# Patient Record
Sex: FEMALE | Race: WHITE | URBAN - METROPOLITAN AREA
[De-identification: names, ages, dates, MRNs, and addresses within clinical notes are randomized per-mention and may not be internally consistent; named-entity substitution may affect disease eponyms.]

---

## 2021-11-19 ENCOUNTER — HOSPITAL ENCOUNTER (INPATIENT)
Facility: HOSPITAL | Age: 26
LOS: 2 days | Discharge: HOME | End: 2021-11-21
Attending: OBSTETRICS & GYNECOLOGY | Admitting: OBSTETRICS & GYNECOLOGY
Payer: MEDICAID

## 2021-11-19 ENCOUNTER — ANESTHESIA (OUTPATIENT)
Dept: OBGYN UNIT | Facility: HOSPITAL | Age: 26
End: 2021-11-19
Payer: MEDICAID

## 2021-11-19 ENCOUNTER — ANESTHESIA EVENT (OUTPATIENT)
Dept: OBGYN UNIT | Facility: HOSPITAL | Age: 26
End: 2021-11-19
Payer: MEDICAID

## 2021-11-19 PROBLEM — Z34.90 PREGNANCY: Status: ACTIVE | Noted: 2021-11-19

## 2021-11-19 PROCEDURE — 86901 BLOOD TYPING SEROLOGIC RH(D): CPT | Performed by: OBSTETRICS & GYNECOLOGY

## 2021-11-19 PROCEDURE — 85025 COMPLETE CBC W/AUTO DIFF WBC: CPT | Performed by: OBSTETRICS & GYNECOLOGY

## 2021-11-19 PROCEDURE — 0KQM0ZZ REPAIR PERINEUM MUSCLE, OPEN APPROACH: ICD-10-PCS | Performed by: OBSTETRICS & GYNECOLOGY

## 2021-11-19 PROCEDURE — 86850 RBC ANTIBODY SCREEN: CPT | Performed by: OBSTETRICS & GYNECOLOGY

## 2021-11-19 PROCEDURE — 86900 BLOOD TYPING SEROLOGIC ABO: CPT | Performed by: OBSTETRICS & GYNECOLOGY

## 2021-11-19 PROCEDURE — 3E033VJ INTRODUCTION OF OTHER HORMONE INTO PERIPHERAL VEIN, PERCUTANEOUS APPROACH: ICD-10-PCS | Performed by: OBSTETRICS & GYNECOLOGY

## 2021-11-19 RX ORDER — ACETAMINOPHEN 500 MG
500 TABLET ORAL EVERY 6 HOURS PRN
Status: DISCONTINUED | OUTPATIENT
Start: 2021-11-19 | End: 2021-11-20 | Stop reason: HOSPADM

## 2021-11-19 RX ORDER — DIAPER,BRIEF,INFANT-TODD,DISP
1 EACH MISCELLANEOUS EVERY 6 HOURS PRN
Status: DISCONTINUED | OUTPATIENT
Start: 2021-11-19 | End: 2021-11-21

## 2021-11-19 RX ORDER — SODIUM CHLORIDE, SODIUM LACTATE, POTASSIUM CHLORIDE, CALCIUM CHLORIDE 600; 310; 30; 20 MG/100ML; MG/100ML; MG/100ML; MG/100ML
INJECTION, SOLUTION INTRAVENOUS CONTINUOUS
Status: DISCONTINUED | OUTPATIENT
Start: 2021-11-19 | End: 2021-11-20 | Stop reason: HOSPADM

## 2021-11-19 RX ORDER — BUPIVACAINE HYDROCHLORIDE 2.5 MG/ML
INJECTION, SOLUTION EPIDURAL; INFILTRATION; INTRACAUDAL
Status: COMPLETED | OUTPATIENT
Start: 2021-11-19 | End: 2021-11-19

## 2021-11-19 RX ORDER — SIMETHICONE 80 MG
80 TABLET,CHEWABLE ORAL 3 TIMES DAILY PRN
Status: DISCONTINUED | OUTPATIENT
Start: 2021-11-19 | End: 2021-11-21

## 2021-11-19 RX ORDER — LIDOCAINE HYDROCHLORIDE 10 MG/ML
INJECTION, SOLUTION INFILTRATION; PERINEURAL
Status: COMPLETED | OUTPATIENT
Start: 2021-11-19 | End: 2021-11-19

## 2021-11-19 RX ORDER — ONDANSETRON 2 MG/ML
4 INJECTION INTRAMUSCULAR; INTRAVENOUS EVERY 6 HOURS PRN
Status: DISCONTINUED | OUTPATIENT
Start: 2021-11-19 | End: 2021-11-20 | Stop reason: HOSPADM

## 2021-11-19 RX ORDER — ONDANSETRON 2 MG/ML
4 INJECTION INTRAMUSCULAR; INTRAVENOUS EVERY 6 HOURS PRN
Status: DISCONTINUED | OUTPATIENT
Start: 2021-11-19 | End: 2021-11-21

## 2021-11-19 RX ORDER — BUPIVACAINE HYDROCHLORIDE 2.5 MG/ML
20 INJECTION, SOLUTION EPIDURAL; INFILTRATION; INTRACAUDAL ONCE
Status: DISCONTINUED | OUTPATIENT
Start: 2021-11-19 | End: 2021-11-20 | Stop reason: HOSPADM

## 2021-11-19 RX ORDER — HYDROCODONE BITARTRATE AND ACETAMINOPHEN 5; 325 MG/1; MG/1
2 TABLET ORAL EVERY 6 HOURS PRN
Status: CANCELLED | OUTPATIENT
Start: 2021-11-19

## 2021-11-19 RX ORDER — GLYCERIN/MINERAL OIL
LOTION (ML) TOPICAL
COMMUNITY

## 2021-11-19 RX ORDER — BUPIVACAINE HCL/0.9 % NACL/PF 0.25 %
5 PLASTIC BAG, INJECTION (ML) EPIDURAL AS NEEDED
Status: DISCONTINUED | OUTPATIENT
Start: 2021-11-19 | End: 2021-11-21

## 2021-11-19 RX ORDER — IBUPROFEN 600 MG/1
600 TABLET ORAL EVERY 6 HOURS
Status: DISCONTINUED | OUTPATIENT
Start: 2021-11-19 | End: 2021-11-21

## 2021-11-19 RX ORDER — DOCUSATE SODIUM 100 MG/1
100 CAPSULE, LIQUID FILLED ORAL 2 TIMES DAILY
Status: DISCONTINUED | OUTPATIENT
Start: 2021-11-19 | End: 2021-11-21

## 2021-11-19 RX ORDER — BISACODYL 10 MG
10 SUPPOSITORY, RECTAL RECTAL ONCE AS NEEDED
Status: DISCONTINUED | OUTPATIENT
Start: 2021-11-19 | End: 2021-11-21

## 2021-11-19 RX ORDER — DEXTROSE, SODIUM CHLORIDE, SODIUM LACTATE, POTASSIUM CHLORIDE, AND CALCIUM CHLORIDE 5; .6; .31; .03; .02 G/100ML; G/100ML; G/100ML; G/100ML; G/100ML
INJECTION, SOLUTION INTRAVENOUS CONTINUOUS
Status: DISCONTINUED | OUTPATIENT
Start: 2021-11-19 | End: 2021-11-20 | Stop reason: HOSPADM

## 2021-11-19 RX ORDER — TRISODIUM CITRATE DIHYDRATE AND CITRIC ACID MONOHYDRATE 500; 334 MG/5ML; MG/5ML
30 SOLUTION ORAL AS NEEDED
Status: DISCONTINUED | OUTPATIENT
Start: 2021-11-19 | End: 2021-11-20 | Stop reason: HOSPADM

## 2021-11-19 RX ORDER — IBUPROFEN 600 MG/1
600 TABLET ORAL EVERY 6 HOURS PRN
Status: DISCONTINUED | OUTPATIENT
Start: 2021-11-19 | End: 2021-11-20 | Stop reason: HOSPADM

## 2021-11-19 RX ORDER — LIDOCAINE HYDROCHLORIDE AND EPINEPHRINE 15; 5 MG/ML; UG/ML
INJECTION, SOLUTION EPIDURAL
Status: COMPLETED | OUTPATIENT
Start: 2021-11-19 | End: 2021-11-19

## 2021-11-19 RX ORDER — AMMONIA INHALANTS 0.04 G/.3ML
0.3 INHALANT RESPIRATORY (INHALATION) AS NEEDED
Status: DISCONTINUED | OUTPATIENT
Start: 2021-11-19 | End: 2021-11-20 | Stop reason: HOSPADM

## 2021-11-19 RX ORDER — ACETAMINOPHEN 325 MG/1
650 TABLET ORAL EVERY 6 HOURS PRN
Status: DISCONTINUED | OUTPATIENT
Start: 2021-11-19 | End: 2021-11-21

## 2021-11-19 RX ORDER — NALBUPHINE HCL 10 MG/ML
2.5 AMPUL (ML) INJECTION
Status: DISCONTINUED | OUTPATIENT
Start: 2021-11-19 | End: 2021-11-21

## 2021-11-19 RX ORDER — AMMONIA INHALANTS 0.04 G/.3ML
0.3 INHALANT RESPIRATORY (INHALATION) AS NEEDED
Status: DISCONTINUED | OUTPATIENT
Start: 2021-11-19 | End: 2021-11-21

## 2021-11-19 RX ORDER — LIDOCAINE HYDROCHLORIDE 10 MG/ML
30 INJECTION, SOLUTION EPIDURAL; INFILTRATION; INTRACAUDAL; PERINEURAL ONCE
Status: DISCONTINUED | OUTPATIENT
Start: 2021-11-19 | End: 2021-11-20 | Stop reason: HOSPADM

## 2021-11-19 RX ORDER — TERBUTALINE SULFATE 1 MG/ML
0.25 INJECTION, SOLUTION SUBCUTANEOUS AS NEEDED
Status: DISCONTINUED | OUTPATIENT
Start: 2021-11-19 | End: 2021-11-20 | Stop reason: HOSPADM

## 2021-11-19 RX ADMIN — LIDOCAINE HYDROCHLORIDE AND EPINEPHRINE 5 ML: 15; 5 INJECTION, SOLUTION EPIDURAL at 20:55:00

## 2021-11-19 RX ADMIN — BUPIVACAINE HYDROCHLORIDE 5 ML: 2.5 INJECTION, SOLUTION EPIDURAL; INFILTRATION; INTRACAUDAL at 20:55:00

## 2021-11-19 RX ADMIN — LIDOCAINE HYDROCHLORIDE 5 ML: 10 INJECTION, SOLUTION INFILTRATION; PERINEURAL at 20:55:00

## 2021-11-19 NOTE — PROGRESS NOTES
Pt is a 23year old female admitted to TR5/TR5-A. Patient presents with:  Leaking  Contractions     Pt is   45 6/7 pregnancy. History obtained, consents signed. Oriented to room, staff, and plan of care.

## 2021-11-20 PROCEDURE — 85025 COMPLETE CBC W/AUTO DIFF WBC: CPT | Performed by: OBSTETRICS & GYNECOLOGY

## 2021-11-20 PROCEDURE — 3E0334Z INTRODUCTION OF SERUM, TOXOID AND VACCINE INTO PERIPHERAL VEIN, PERCUTANEOUS APPROACH: ICD-10-PCS | Performed by: OBSTETRICS & GYNECOLOGY

## 2021-11-20 PROCEDURE — 85461 HEMOGLOBIN FETAL: CPT | Performed by: OBSTETRICS & GYNECOLOGY

## 2021-11-20 PROCEDURE — 88307 TISSUE EXAM BY PATHOLOGIST: CPT | Performed by: OBSTETRICS & GYNECOLOGY

## 2021-11-20 NOTE — H&P
[address] [facility] Patient Status:  Inpatient    1995 MRN [id number]   Location [address] [place] [address] Attending Venessa Cash MD   Hosp Day # 0 PCP No primary care provider on file. yes  Sterile speculum exam:   Sterile vaginal exam: Dilation: 2  Effacement: 100%  Station: -2  Position: Cephalic    Results:     Lab Results   Component Value Date    TREPONEMALAB negative 09/02/2021    TREPONEMALAB negative 09/02/2021    TREPONEMALAB ne

## 2021-11-20 NOTE — PROGRESS NOTES
[facility]    OB/GYNE Progress Note      Rusty Franklin Patient Status:  Inpatient    1995 MRN [id number]   Location [facility] 3SE Attending Jennye Denver, MD   Hosp Day # 1 PCP No primary care provider on file.         Ass

## 2021-11-20 NOTE — ANESTHESIA PREPROCEDURE EVALUATION
Anesthesia PreOp Note    HPI:     Vikki Smyth is a 23year old female who presents for preoperative consultation requested by: * No surgeons listed *    Date of Surgery: 11/19/2021    * No procedures listed *  Indication: * No pre-op diagnosis entered * 11/19/21 1600, 8 fer-units/min at 11/19/21 1600  lactated ringers IV bolus 1,000 mL, 1,000 mL, Intravenous, Once, Nonah Gottron, MD  fentaNYL 2mcg/ml & bupivacaine 1.25mg/ml epidural infusion, , Epidural, Continuous, MD Lois Goyal mass index is 44.63 kg/m². height is 1.575 m (5' 2\") and weight is 110.7 kg (244 lb). Her temperature is 98.2 °F (36.8 °C). Her blood pressure is 136/80 and her pulse is 113. Her oxygen saturation is 100%.     11/19/21 2046 11/19/21 2100 11/19/21 2125

## 2021-11-20 NOTE — ANESTHESIA PROCEDURE NOTES
Labor Analgesia    Date/Time: 11/19/2021 8:55 PM  Performed by: Kurt Vigil MD  Authorized by: Kurt Vigil MD       General Information and Staff    Start Time:  11/19/2021 8:41 PM  End Time:  11/19/2021 8:55 PM  Anesthesiologist:  Christina Mckinley

## 2021-11-20 NOTE — OPERATIVE REPORT
[facility]    Vaginal Delivery Note    Luis Koch Patient Status:  Inpatient    1995 MRN [id number]   Location [address] Attending Hugo Soto MD   Hosp Day # 0 PCP No primary care provider on fi

## 2021-11-20 NOTE — PROGRESS NOTES
Patient up to bathroom with assist x 2. Unable to void at this time. Patient transferred to mother/baby room 353 per wheelchair in stable condition with baby and personal belongings. Accompanied by significant other and staff.   Report given to Yamila Jiménez

## 2021-11-20 NOTE — PROGRESS NOTES
Received patient into room 353 via [facility]. Bedside shift report received from Larisa Saini. Pt transferred to bed. Bed in lowest and locked position. Side rails up x2. VSS. IV site unremarkable.  Baby present in open crib.  ID bands matched. [place] and family or

## 2021-11-20 NOTE — ANESTHESIA POSTPROCEDURE EVALUATION
Patient: Sabino Vasquez    Procedure Summary     Date: 11/19/21 Room / Location:     Anesthesia Start: 2041 Anesthesia Stop:     Procedure: LABOR ANALGESIA Diagnosis:     Scheduled Providers:  Anesthesiologist: Aziza Herr MD    Anesthesia Type: ep

## 2021-11-21 VITALS
HEIGHT: 62 IN | RESPIRATION RATE: 16 BRPM | HEART RATE: 78 BPM | WEIGHT: 244 LBS | TEMPERATURE: 98 F | OXYGEN SATURATION: 100 % | DIASTOLIC BLOOD PRESSURE: 73 MMHG | SYSTOLIC BLOOD PRESSURE: 122 MMHG | BODY MASS INDEX: 44.9 KG/M2

## 2021-11-21 RX ORDER — PSEUDOEPHEDRINE HCL 30 MG
100 TABLET ORAL 2 TIMES DAILY
Qty: 20 CAPSULE | Refills: 1 | Status: SHIPPED | OUTPATIENT
Start: 2021-11-21

## 2021-11-21 RX ORDER — MELATONIN
325
Qty: 42 TABLET | Refills: 1 | Status: SHIPPED | OUTPATIENT
Start: 2021-11-21

## 2021-11-21 RX ORDER — IBUPROFEN 600 MG/1
600 TABLET ORAL EVERY 6 HOURS
Qty: 20 TABLET | Refills: 1 | Status: SHIPPED | OUTPATIENT
Start: 2021-11-21

## 2021-11-21 NOTE — DISCHARGE SUMMARY
[facility]    Discharge Summary    Dayne Lopez Patient Status:  Inpatient    1995 MRN [id number]   Location [facility] 3SE Attending Jennifer Villarreal MD   Hosp Day # 2 PCP No primary care provider on file.      Date of Ad In 6 weeks.     Specialty: OBSTETRICS & GYNECOLOGY  Contact information:  [address] [place] [address] [place]  [address]  [phone]                         Follow up Labs: in              Heather Marcelo MD  11/21/2021

## 2021-11-21 NOTE — CM/SW NOTE
MDO to MARIA INES for finances. MARIA INES LVM w/ Ana Soto @ [facility] to assist w/Medicaid. MARIA INES tubed appropriate resources to pt for Medicaid/WIC. SW/CM to remain available for support and/or discharge planning.      NEAL Graf, [employer] Work   MEK:#[id number]

## 2021-11-21 NOTE — PLAN OF CARE
Problem: PAIN - ADULT  Goal: Verbalizes/displays adequate comfort level or patient's stated pain goal  Description: INTERVENTIONS:  - Encourage pt to monitor pain and request assistance  - Assess pain using appropriate pain scale  - Administer analgesics Progressing  Goal: Appropriate maternal -  bonding  Description: INTERVENTIONS:  - Assess caregiver- interactions. - Assess caregiver's emotional status and coping mechanisms. - Encourage caregiver to participate in  daily care.   - A

## 2022-07-28 LAB
HEPATITIS B SURFACE ANTIGEN OB: NEGATIVE
HIV RESULT OB: NEGATIVE
RAPID PLASMA REAGIN OB: NONREACTIVE

## 2022-11-15 LAB
AMB EXT TREPONEMAL ANTIBODIES: NEGATIVE
HIV RESULT OB: NEGATIVE

## 2022-11-19 LAB — ANTIBODY SCREEN OB: POSITIVE

## 2022-11-28 LAB — AMB EXT STREP B CULTURE: NEGATIVE

## 2022-12-12 ENCOUNTER — HOSPITAL ENCOUNTER (OUTPATIENT)
Facility: HOSPITAL | Age: 27
Discharge: HOME | End: 2022-12-12
Attending: OBSTETRICS & GYNECOLOGY | Admitting: OBSTETRICS & GYNECOLOGY
Payer: MEDICAID

## 2022-12-12 VITALS — DIASTOLIC BLOOD PRESSURE: 79 MMHG | SYSTOLIC BLOOD PRESSURE: 119 MMHG | HEART RATE: 119 BPM

## 2022-12-12 PROCEDURE — 99213 OFFICE O/P EST LOW 20 MIN: CPT

## 2022-12-12 PROCEDURE — 59025 FETAL NON-STRESS TEST: CPT

## 2022-12-12 NOTE — PROGRESS NOTES
Pt is a 33year old female admitted to TR1/TR1-A. Patient presents with:  Contractions: Irregular since 03:30 am     Pt is  38w0d intra-uterine pregnancy. History obtained, consents signed. Oriented to room, staff, and plan of care.

## 2022-12-12 NOTE — TRIAGE
[facility]      Triage Note    Jo Patel Patient Status:  Outpatient    1995 MRN [id number]   Location [address] Attending Danni Lynn MD   Hosp Day # 0 PCP No primary care provider on file. Deja Muir: [id number]  Estimated Date of Delivery: 22  Gestation: 38w0d    Chief Complaint    Contractions         Allergies:    Clindamycin             HIVES    Orders Placed This Encounter      Rubella, IGG      RPR W/Conf      Hepatitis B Surface Antigen      Rapid HIV      Rapid HIV      T Pallidum Screening Cascade      Strep B Culture      Lab Results   Component Value Date    WBC 15.1 (H) 2021    HGB 7.3 (L) 2021    HCT 24.3 (L) 2021    .0 2021       Clinitek UA  No results found for: Yue Sobia, GLUUR, URINEBILI, URINEKETONE, SPECGRAVITY, PHUR, PROTURINE, UROBILI, URINENITRITE, URINELEUK, URINECUL    UA  No results found for: COLORUR, CLARITY, SPECGRAVITY, PROUR, GLUUR, KETUR, BILUR, BLOODURINE, NITRITE, UROBILINOGEN, LEUUR, UASA     22  0745   BP: 119/79   Pulse: 119       NST  Variability: Moderate           Accelerations: Yes           Decelerations: None            Baseline: 125 BPM           Uterine Irritability: No           Contractions: Irregular                                        Acoustic Stimulator: No           Nonstress Test Interpretation: Reactive           Nonstress Test Second Interpretation: Reactive                        Additional Comments       Patient presents with:  Contractions: Irregular since 03:30 am  EFM tracing reactive with irregular contractions noted. SVE 0/0/-3. Dr Sincere Martinez informed of findings. Discharge order received. Kick count and labor precautions reviewed with pt. Pt states understanding. Discharged to home.       Pk Medellin RN  2022 8:35 AM

## 2022-12-15 ENCOUNTER — HOSPITAL ENCOUNTER (INPATIENT)
Facility: HOSPITAL | Age: 27
LOS: 2 days | Discharge: HOME | End: 2022-12-17
Attending: OBSTETRICS & GYNECOLOGY | Admitting: OBSTETRICS & GYNECOLOGY
Payer: MEDICAID

## 2022-12-15 ENCOUNTER — ANESTHESIA (OUTPATIENT)
Dept: OBGYN UNIT | Facility: HOSPITAL | Age: 27
End: 2022-12-15
Payer: MEDICAID

## 2022-12-15 ENCOUNTER — ANESTHESIA EVENT (OUTPATIENT)
Dept: OBGYN UNIT | Facility: HOSPITAL | Age: 27
End: 2022-12-15
Payer: MEDICAID

## 2022-12-15 LAB
ANTIBODY SCREEN: NEGATIVE
BASOPHILS # BLD AUTO: 0.02 X10(3) UL (ref 0–0.2)
BASOPHILS NFR BLD AUTO: 0.2 %
DEPRECATED RDW RBC AUTO: 45.6 FL (ref 35.1–46.3)
EOSINOPHIL # BLD AUTO: 0.04 X10(3) UL (ref 0–0.7)
EOSINOPHIL NFR BLD AUTO: 0.5 %
ERYTHROCYTE [DISTWIDTH] IN BLOOD BY AUTOMATED COUNT: 13.7 % (ref 11–15)
HCT VFR BLD AUTO: 39.1 %
HGB BLD-MCNC: 13.3 G/DL
IMM GRANULOCYTES # BLD AUTO: 0.03 X10(3) UL (ref 0–1)
IMM GRANULOCYTES NFR BLD: 0.4 %
LYMPHOCYTES # BLD AUTO: 1.7 X10(3) UL (ref 1–4)
LYMPHOCYTES NFR BLD AUTO: 20 %
MCH RBC QN AUTO: 31.1 PG (ref 26–34)
MCHC RBC AUTO-ENTMCNC: 34 G/DL (ref 31–37)
MCV RBC AUTO: 91.4 FL
MONOCYTES # BLD AUTO: 0.35 X10(3) UL (ref 0.1–1)
MONOCYTES NFR BLD AUTO: 4.1 %
NEUTROPHILS # BLD AUTO: 6.37 X10 (3) UL (ref 1.5–7.7)
NEUTROPHILS # BLD AUTO: 6.37 X10(3) UL (ref 1.5–7.7)
NEUTROPHILS NFR BLD AUTO: 74.8 %
PLATELET # BLD AUTO: 280 10(3)UL (ref 150–450)
RBC # BLD AUTO: 4.28 X10(6)UL
RH BLOOD TYPE: NEGATIVE
RUPTURE OF MEMBRANE (ROM): POSITIVE
SARS-COV-2 RNA RESP QL NAA+PROBE: NOT DETECTED
WBC # BLD AUTO: 8.5 X10(3) UL (ref 4–11)

## 2022-12-15 PROCEDURE — 0HQ9XZZ REPAIR PERINEUM SKIN, EXTERNAL APPROACH: ICD-10-PCS | Performed by: OBSTETRICS & GYNECOLOGY

## 2022-12-15 PROCEDURE — 84112 EVAL AMNIOTIC FLUID PROTEIN: CPT | Performed by: OBSTETRICS & GYNECOLOGY

## 2022-12-15 PROCEDURE — 99214 OFFICE O/P EST MOD 30 MIN: CPT

## 2022-12-15 PROCEDURE — 86901 BLOOD TYPING SEROLOGIC RH(D): CPT | Performed by: OBSTETRICS & GYNECOLOGY

## 2022-12-15 PROCEDURE — 85025 COMPLETE CBC W/AUTO DIFF WBC: CPT | Performed by: OBSTETRICS & GYNECOLOGY

## 2022-12-15 PROCEDURE — 86850 RBC ANTIBODY SCREEN: CPT | Performed by: OBSTETRICS & GYNECOLOGY

## 2022-12-15 PROCEDURE — 86900 BLOOD TYPING SEROLOGIC ABO: CPT | Performed by: OBSTETRICS & GYNECOLOGY

## 2022-12-15 RX ORDER — LIDOCAINE HYDROCHLORIDE 10 MG/ML
30 INJECTION, SOLUTION EPIDURAL; INFILTRATION; INTRACAUDAL; PERINEURAL ONCE
Status: DISCONTINUED | OUTPATIENT
Start: 2022-12-15 | End: 2022-12-16 | Stop reason: HOSPADM

## 2022-12-15 RX ORDER — AMMONIA INHALANTS 0.04 G/.3ML
0.3 INHALANT RESPIRATORY (INHALATION) AS NEEDED
Status: DISCONTINUED | OUTPATIENT
Start: 2022-12-15 | End: 2022-12-17

## 2022-12-15 RX ORDER — ONDANSETRON 2 MG/ML
4 INJECTION INTRAMUSCULAR; INTRAVENOUS EVERY 6 HOURS PRN
Status: DISCONTINUED | OUTPATIENT
Start: 2022-12-15 | End: 2022-12-16 | Stop reason: HOSPADM

## 2022-12-15 RX ORDER — ONDANSETRON 2 MG/ML
4 INJECTION INTRAMUSCULAR; INTRAVENOUS EVERY 6 HOURS PRN
Status: DISCONTINUED | OUTPATIENT
Start: 2022-12-15 | End: 2022-12-17

## 2022-12-15 RX ORDER — ACETAMINOPHEN 500 MG
500 TABLET ORAL EVERY 6 HOURS PRN
Status: DISCONTINUED | OUTPATIENT
Start: 2022-12-15 | End: 2022-12-17

## 2022-12-15 RX ORDER — ACETAMINOPHEN 500 MG
1000 TABLET ORAL EVERY 6 HOURS PRN
Status: DISCONTINUED | OUTPATIENT
Start: 2022-12-15 | End: 2022-12-17

## 2022-12-15 RX ORDER — AMMONIA INHALANTS 0.04 G/.3ML
0.3 INHALANT RESPIRATORY (INHALATION) AS NEEDED
Status: DISCONTINUED | OUTPATIENT
Start: 2022-12-15 | End: 2022-12-16 | Stop reason: HOSPADM

## 2022-12-15 RX ORDER — SODIUM CHLORIDE, SODIUM LACTATE, POTASSIUM CHLORIDE, CALCIUM CHLORIDE 600; 310; 30; 20 MG/100ML; MG/100ML; MG/100ML; MG/100ML
INJECTION, SOLUTION INTRAVENOUS CONTINUOUS
Status: DISCONTINUED | OUTPATIENT
Start: 2022-12-15 | End: 2022-12-16 | Stop reason: HOSPADM

## 2022-12-15 RX ORDER — DOCUSATE SODIUM 100 MG/1
100 CAPSULE, LIQUID FILLED ORAL 2 TIMES DAILY
Status: DISCONTINUED | OUTPATIENT
Start: 2022-12-15 | End: 2022-12-17

## 2022-12-15 RX ORDER — TRISODIUM CITRATE DIHYDRATE AND CITRIC ACID MONOHYDRATE 500; 334 MG/5ML; MG/5ML
30 SOLUTION ORAL AS NEEDED
Status: DISCONTINUED | OUTPATIENT
Start: 2022-12-15 | End: 2022-12-16 | Stop reason: HOSPADM

## 2022-12-15 RX ORDER — ACETAMINOPHEN 500 MG
500 TABLET ORAL EVERY 6 HOURS PRN
Status: DISCONTINUED | OUTPATIENT
Start: 2022-12-15 | End: 2022-12-16 | Stop reason: HOSPADM

## 2022-12-15 RX ORDER — DIAPER,BRIEF,INFANT-TODD,DISP
1 EACH MISCELLANEOUS EVERY 6 HOURS PRN
Status: DISCONTINUED | OUTPATIENT
Start: 2022-12-15 | End: 2022-12-17

## 2022-12-15 RX ORDER — BISACODYL 10 MG
10 SUPPOSITORY, RECTAL RECTAL ONCE AS NEEDED
Status: DISCONTINUED | OUTPATIENT
Start: 2022-12-15 | End: 2022-12-17

## 2022-12-15 RX ORDER — HYDROCODONE BITARTRATE AND ACETAMINOPHEN 5; 325 MG/1; MG/1
1 TABLET ORAL EVERY 6 HOURS PRN
Status: CANCELLED | OUTPATIENT
Start: 2022-12-15

## 2022-12-15 RX ORDER — SIMETHICONE 80 MG
80 TABLET,CHEWABLE ORAL 3 TIMES DAILY PRN
Status: DISCONTINUED | OUTPATIENT
Start: 2022-12-15 | End: 2022-12-17

## 2022-12-15 RX ORDER — LIDOCAINE HYDROCHLORIDE AND EPINEPHRINE 15; 5 MG/ML; UG/ML
INJECTION, SOLUTION EPIDURAL AS NEEDED
Status: DISCONTINUED | OUTPATIENT
Start: 2022-12-15 | End: 2022-12-15 | Stop reason: SURG

## 2022-12-15 RX ORDER — NALBUPHINE HCL 10 MG/ML
2.5 AMPUL (ML) INJECTION
Status: DISCONTINUED | OUTPATIENT
Start: 2022-12-15 | End: 2022-12-17

## 2022-12-15 RX ORDER — BUPIVACAINE HCL/0.9 % NACL/PF 0.25 %
5 PLASTIC BAG, INJECTION (ML) EPIDURAL AS NEEDED
Status: DISCONTINUED | OUTPATIENT
Start: 2022-12-15 | End: 2022-12-17

## 2022-12-15 RX ORDER — IBUPROFEN 600 MG/1
600 TABLET ORAL EVERY 6 HOURS
Status: DISCONTINUED | OUTPATIENT
Start: 2022-12-15 | End: 2022-12-17

## 2022-12-15 RX ORDER — BUPIVACAINE HYDROCHLORIDE 2.5 MG/ML
20 INJECTION, SOLUTION EPIDURAL; INFILTRATION; INTRACAUDAL ONCE
Status: COMPLETED | OUTPATIENT
Start: 2022-12-15 | End: 2022-12-15

## 2022-12-15 RX ORDER — IBUPROFEN 600 MG/1
600 TABLET ORAL EVERY 6 HOURS PRN
Status: DISCONTINUED | OUTPATIENT
Start: 2022-12-15 | End: 2022-12-16 | Stop reason: HOSPADM

## 2022-12-15 RX ORDER — TERBUTALINE SULFATE 1 MG/ML
0.25 INJECTION, SOLUTION SUBCUTANEOUS AS NEEDED
Status: DISCONTINUED | OUTPATIENT
Start: 2022-12-15 | End: 2022-12-16 | Stop reason: HOSPADM

## 2022-12-15 RX ORDER — DEXTROSE, SODIUM CHLORIDE, SODIUM LACTATE, POTASSIUM CHLORIDE, AND CALCIUM CHLORIDE 5; .6; .31; .03; .02 G/100ML; G/100ML; G/100ML; G/100ML; G/100ML
INJECTION, SOLUTION INTRAVENOUS CONTINUOUS
Status: DISCONTINUED | OUTPATIENT
Start: 2022-12-15 | End: 2022-12-16 | Stop reason: HOSPADM

## 2022-12-15 RX ORDER — LIDOCAINE HYDROCHLORIDE 10 MG/ML
INJECTION, SOLUTION EPIDURAL; INFILTRATION; INTRACAUDAL; PERINEURAL AS NEEDED
Status: DISCONTINUED | OUTPATIENT
Start: 2022-12-15 | End: 2022-12-15 | Stop reason: SURG

## 2022-12-15 RX ADMIN — BUPIVACAINE HYDROCHLORIDE 10 ML: 2.5 INJECTION, SOLUTION EPIDURAL; INFILTRATION; INTRACAUDAL at 20:06:00

## 2022-12-15 RX ADMIN — LIDOCAINE HYDROCHLORIDE 3 ML: 10 INJECTION, SOLUTION EPIDURAL; INFILTRATION; INTRACAUDAL; PERINEURAL at 20:02:00

## 2022-12-15 RX ADMIN — LIDOCAINE HYDROCHLORIDE AND EPINEPHRINE 5 ML: 15; 5 INJECTION, SOLUTION EPIDURAL at 20:05:00

## 2022-12-16 LAB
BASOPHILS # BLD AUTO: 0.02 X10(3) UL (ref 0–0.2)
BASOPHILS NFR BLD AUTO: 0.2 %
DEPRECATED RDW RBC AUTO: 45.1 FL (ref 35.1–46.3)
EOSINOPHIL # BLD AUTO: 0.04 X10(3) UL (ref 0–0.7)
EOSINOPHIL NFR BLD AUTO: 0.4 %
ERYTHROCYTE [DISTWIDTH] IN BLOOD BY AUTOMATED COUNT: 13.4 % (ref 11–15)
FETAL SCREEN RESULT: NEGATIVE
HCT VFR BLD AUTO: 33.6 %
HGB BLD-MCNC: 11.4 G/DL
IMM GRANULOCYTES # BLD AUTO: 0.03 X10(3) UL (ref 0–1)
IMM GRANULOCYTES NFR BLD: 0.3 %
LYMPHOCYTES # BLD AUTO: 2.22 X10(3) UL (ref 1–4)
LYMPHOCYTES NFR BLD AUTO: 20.4 %
MCH RBC QN AUTO: 30.8 PG (ref 26–34)
MCHC RBC AUTO-ENTMCNC: 33.9 G/DL (ref 31–37)
MCV RBC AUTO: 90.8 FL
MONOCYTES # BLD AUTO: 0.61 X10(3) UL (ref 0.1–1)
MONOCYTES NFR BLD AUTO: 5.6 %
NEUTROPHILS # BLD AUTO: 7.96 X10 (3) UL (ref 1.5–7.7)
NEUTROPHILS # BLD AUTO: 7.96 X10(3) UL (ref 1.5–7.7)
NEUTROPHILS NFR BLD AUTO: 73.1 %
PLATELET # BLD AUTO: 262 10(3)UL (ref 150–450)
RBC # BLD AUTO: 3.7 X10(6)UL
WBC # BLD AUTO: 10.9 X10(3) UL (ref 4–11)

## 2022-12-16 PROCEDURE — 3E0334Z INTRODUCTION OF SERUM, TOXOID AND VACCINE INTO PERIPHERAL VEIN, PERCUTANEOUS APPROACH: ICD-10-PCS | Performed by: OBSTETRICS & GYNECOLOGY

## 2022-12-16 PROCEDURE — 85025 COMPLETE CBC W/AUTO DIFF WBC: CPT | Performed by: OBSTETRICS & GYNECOLOGY

## 2022-12-16 PROCEDURE — 85461 HEMOGLOBIN FETAL: CPT | Performed by: OBSTETRICS & GYNECOLOGY

## 2022-12-16 RX ORDER — MELATONIN
325
Qty: 42 TABLET | Refills: 0 | Status: SHIPPED | OUTPATIENT
Start: 2022-12-16

## 2022-12-16 RX ORDER — DOCUSATE SODIUM 100 MG/1
100 CAPSULE, LIQUID FILLED ORAL 2 TIMES DAILY
Qty: 30 CAPSULE | Refills: 0 | Status: SHIPPED | OUTPATIENT
Start: 2022-12-16

## 2022-12-16 RX ORDER — IBUPROFEN 600 MG/1
600 TABLET ORAL EVERY 8 HOURS PRN
Qty: 20 TABLET | Refills: 0 | Status: SHIPPED | OUTPATIENT
Start: 2022-12-16

## 2022-12-16 NOTE — OPERATIVE REPORT
[facility]    Vaginal Delivery Note    Rita Common Patient Status:  Inpatient    1995 MRN [id number]   Location [address] Attending Sandy Barrios MD   Hosp Day # 0 PCP No primary care provider on file.      Delivery     Infant  Date of Delivery: 12/15/2022   Time of Delivery: 10:09 PM  Delivery Type: Normal spontaneous vaginal delivery    Infant Sex/Birthweight: female 7 lb 9 oz (3.43 kg)     Presentation Vertex [1]  Position Left [1] Occiput [1] Anterior [1]    Apgars:  1 minute:                 5 minutes:                          10 minutes:      Placenta  Date/Time of Delivery:     Delivery: spontaneous  Placenta to Pathology: no  Cord Gases Submitted: no  Cord Blood Collection: yes  Cord Tissue Collection: no  Cord Complications: none  Sponge and Needle Counts:  Verified yes    Maternal Anesthesia: epidural   Episiotomy/Laceration Repair  Laceration: perineal 1 degree  Rep w/ 30 vicryl  Delivery Complications  none    Neonatologist Present: no  Delivery Comment: juan    Intake/Output   EBL:  See chart ml    Mauro Yarbrough MD   12/15/2022  10:25 PM

## 2022-12-16 NOTE — CM/SW NOTE
SW self referral due to finances/WIC resources    SW met with patient and FOB  bedside. SW confirmed face sheet contact as correct. Baby boy/girl name:Baby girl [place]  Date & time of delivery:12/15/22 @ 10:09pm  Delivery method:Normal spontaneous vaginal delivery  Siblings age:1 yr old    Patient employed:Denied  Length of maternity leave:n/a    Father of baby employed:Yes  Length of paternity leave:Denied    Breast or formula feed:Breast and formula feed    Pediatrician: Dr. Juancarlos Hendricks contacted:Yes    Mental Health History: Denied    Medications:n/a    Therapist:n/a    Psychiatrist:n/a    SW discussed signs, symptoms and risks associated with post partum depression & anxiety. SW provided pt with PMAD resources. Other resources provided: Pt endorses being current w/WIC services. Patient support system:FOB and pt's parents    Patient denied current questions/needs from SW.    SW/CM to remain available for support and/or discharge planning.       Lauro Garcia MSW, [facility]  Social Work   LAB:#[id number]

## 2022-12-16 NOTE — PROGRESS NOTES
Patient received into room 358 via wheel chair. Beside report received from L&D RN Tucker Henley. Patient transferred to bed without incident. Bed in locked and low position. Side rails up x 2. VSS. Fundus firm at u/u, lochia small, no clots noted. IV site unremarkable. Pain level zero. Baby girl present at bedside in open crib, ID bands checked and verified. Patient/family oriented to unit, room and call light. Call light within patient reach. Reinforced to patient to call for assistance before getting out of bed to bathroom. Plan of care reviewed. Will continue to monitor per protocol.

## 2022-12-16 NOTE — ANESTHESIA POSTPROCEDURE EVALUATION
Patient: Asher Goins    Procedure Summary     Date: 12/15/22 Room / Location:     Anesthesia Start: 2002 Anesthesia Stop: 2214    Procedure: LABOR ANALGESIA Diagnosis:     Scheduled Providers:  Anesthesiologist: Lorna Jimenez MD    Anesthesia Type: epidural ASA Status: 2 - Emergent          Anesthesia Type: epidural    Vitals Value Taken Time   /70 12/15/22 2245   Temp  12/15/22 2247   Pulse 97 12/15/22 2245   Resp 16 12/15/22 2247   SpO2 100 % 12/15/22 2244   Vitals shown include unvalidated device data.     [address] [facility] AN Post Evaluation:   Patient Evaluated in PACU  Patient Participation: complete - patient participated  Level of Consciousness: awake  Pain Score: 0  Pain Management: adequate  Airway Patency:patent  Dental exam unchanged from preop  Yes    Cardiovascular Status: acceptable  Respiratory Status: acceptable  Postoperative Hydration acceptable      Renetta Smith MD  12/15/2022 10:47 PM

## 2022-12-16 NOTE — ANESTHESIA PROCEDURE NOTES
Labor Analgesia    Date/Time: 12/15/2022 8:02 PM  Performed by: Yuriy Garg MD  Authorized by: Yuriy Garg MD       General Information and Staff    Start Time:  12/15/2022 8:02 PM  End Time:  12/15/2022 8:07 PM  Anesthesiologist:  Yuriy Garg MD  Performed by:   Anesthesiologist  Patient Location:  OB  Site Identification: surface landmarks    Reason for Block: labor epidural    Preanesthetic Checklist: patient identified, IV checked, site marked, risks and benefits discussed, monitors and equipment checked, pre-op evaluation, timeout performed, IV bolus, anesthesia consent and sterile technique used      Procedure Details    Patient Position:  Sitting  Prep: ChloraPrep and patient draped    Monitoring:  Heart rate, cardiac monitor and continuous pulse ox  Approach:  Midline    Epidural Needle    Injection Technique:  BRET air  Needle Type:  Tuohy  Needle Gauge:  18 G  Needle Length:  3.5 in  Needle Insertion Depth:  6.5  Location:  L3-4    Spinal Needle      Catheter    Catheter Type:  Multi-orifice  Catheter Size:  20 G  Catheter at Skin Depth:  11  Test Dose:  Negative    Assessment    Sensory Level:  T10    Additional Comments

## 2022-12-16 NOTE — LACTATION NOTE
LACTATION NOTE - MOTHER           Problems identified  Problems identified: Knowledge deficit;Milk supply not WNL  Milk supply not WNL: Reduced (potential)  Problems Identified Other: Infant w/ jaundice under phototherapy    Maternal history  Maternal history: Anemia;Obesity    Breastfeeding goal  Breastfeeding goal: To maintain breast milk feeding per patient goal    Maternal Assessment  Bilateral Breasts: Soft;Symmetrical  Bilateral Nipples: Slightly everted/short  Prior breastfeeding experience (comment below): Multip; Unsuccessful  Breastfeeding Assistance: Breastfeeding assistance provided with permission    Pain assessment  Treatment of Sore Nipples: Lanolin    Guidelines for use of:  Breast pump type: Ameda Platinum;Hand Pump  Current use of pump[de-identified] Initiated  Suggested use of pump: Pump 8-12X/24hr  Other (comment): Mom reports infant BF well, now under phototherapy and receiving formula supplement. Educated on jaundice physiology, pumping indications and guidelines for supplementing. Breast pump initiated, encouraged to pump 8x/day and to continue offering breast prior to bottle feeding.

## 2022-12-16 NOTE — PROGRESS NOTES
Patient up to bathroom with assist x 2. Unable to void at this time. Bladder scan = 250. Patient transferred to mother/baby room 358 per wheelchair in stable condition with baby and personal belongings. Accompanied by significant other and staff. Report given to mother/baby RNMaura.

## 2022-12-17 VITALS
OXYGEN SATURATION: 99 % | SYSTOLIC BLOOD PRESSURE: 130 MMHG | DIASTOLIC BLOOD PRESSURE: 73 MMHG | HEART RATE: 74 BPM | RESPIRATION RATE: 18 BRPM | TEMPERATURE: 98 F

## 2022-12-17 PROCEDURE — 3E02340 INTRODUCTION OF INFLUENZA VACCINE INTO MUSCLE, PERCUTANEOUS APPROACH: ICD-10-PCS | Performed by: OBSTETRICS & GYNECOLOGY

## 2022-12-17 PROCEDURE — 90471 IMMUNIZATION ADMIN: CPT

## 2022-12-17 NOTE — DISCHARGE SUMMARY
[facility]    Discharge Summary    Ana Paula Jimenez Patient Status:  Inpatient    1995 MRN [id number]   Location [facility] 3SE Attending Michelle Meehan MD   Hosp Day # 1 PCP No primary care provider on file. Date of Admission: 12/15/2022    Date of Discharge: 22  Discharge Time:     Admission Diagnoses: R/O ROM  Pregnancy    Secondary Diagnosis:     Primary OB Clinician: [facility] Course:     EDC: Estimated Date of Delivery: 22    Gestational Age: 36w4d    Date of Delivery: 12/15/22  Time of Delivery:     Antepartum complications: obesity 3, rh neg    Delivered By: Bolivar Whitley MD;  Delivery Type:     Tubal Ligation: n/a    Baby: Liveborn ,     Apgars:  1 minute:                    5 minutes:                           10 minutes:      Anesthesia:           Intrapartum Complications: 38 3/7 srom    Laceration: 1st degree    Episiotomy:     Placenta: spontaneous    Feeding Method:     Rh Immune Globulin Given:     Rubella Vaccine Given:         Discharge Plan:   Discharge Condition: Stable  Early Discharge:  NO    Discharge medications:  Current Discharge Medication List    Home Meds - Modified    ibuprofen 600 MG Oral Tab  Take 1 tablet (600 mg total) by mouth every 8 (eight) hours as needed for Pain. ferrous sulfate 325 (65 FE) MG Oral Tab EC  Take 1 tablet (325 mg total) by mouth daily with breakfast.    docusate sodium 100 MG Oral Cap  Take 1 capsule (100 mg total) by mouth 2 (two) times daily. Home Meds - Unchanged    Prenatal Vit-Fe Fumarate-FA (SM PRENATAL VITAMINS) 28-0.8 MG Oral Tab  Take by mouth. Discharge Diet: As tolerated    Discharge Activity: As tolerated    Follow up: Follow-up  E  [facility]. Call.     Specialty: Pediatrics  Why: As needed  Contact information:  [address] [facility] [address] [phone]           Michelle Meehan MD Follow up in 6 week(s).     Specialty: OBSTETRICS & GYNECOLOGY  Contact information:  [address] [place] [address] [place]  [address] [phone]                         Follow up Labs:  in             Alyson Hutchins MD  12/16/2022

## 2022-12-17 NOTE — LACTATION NOTE
LACTATION NOTE - MOTHER      Evaluation Type: Inpatient    Problems identified  Problems identified: Knowledge deficit  Milk supply not WNL: Reduced (potential)  Problems Identified Other: Infant w/ jaundice under phototherapy, CH+, supplementing         Breastfeeding goal  Breastfeeding goal: To maintain breast milk feeding per patient goal    Maternal Assessment  Breastfeeding Assistance: [address] [facility] assistance declined at this time         Guidelines for use of: Other (comment): Anticpated discharge home today, few questions asked/answered.  Reviewed continued lactation support via warm line and OP services

## 2022-12-19 NOTE — PAYOR COMM NOTE
--------------  DISCHARGE REVIEW    Topher Alanis #:  [id number]  Authorization Number: [id number]    Admit date: 12/15/22  Admit time:   5:01 PM  Discharge Date: 2022  3:20 PM     Admitting Physician: Dread Anna MD  Attending Physician:  No att. providers found  Primary Care Physician: No primary care provider on file. Discharge Summary Notes      Discharge Summary signed by Dread Anna MD at 2022  7:46 PM     Author: Dread Anna MD Specialty: OBSTETRICS & GYNECOLOGY Author Type: Physician    Filed: 2022  7:46 PM Date of Service: 2022  7:44 PM Status: Signed    : Dread Anna MD (Physician)           [facility]    Discharge Summary    Shell Mcknight Patient Status:  Inpatient    1995 MRN [id number]   Location [facility] 3S Attending Dread Anna MD   Hosp Day # 1 PCP No primary care provider on file.      Date of Admission: 12/15/2022    Date of Discharge: 22  Discharge Time:     Admission Diagnoses: R/O ROM  Pregnancy    Secondary Diagnosis:     Primary OB Clinician: [facility] Course:     EDC: Estimated Date of Delivery: 22    Gestational Age: 36w4d    Date of Delivery: 12/15/22  Time of Delivery:     Antepartum complications: obesity 3, rh neg    Delivered By: Denis Purdy MD;  Delivery Type:     Tubal Ligation: n/a    Baby: Liveborn ,     Apgars:  1 minute:                    5 minutes:                           10 minutes:      Anesthesia:           Intrapartum Complications: 38 3/7 srom    Laceration: 1st degree    Episiotomy:     Placenta: spontaneous    Feeding Method:     Rh Immune Globulin Given:     Rubella Vaccine Given:         Discharge Plan:   Discharge Condition: Stable  Early Discharge:  NO    Discharge medications:  Current Discharge Medication List    Home Meds - Modified    ibuprofen 600 MG Oral Tab  Take 1 tablet (600 mg total) by mouth every 8 (eight) hours as needed for Pain. ferrous sulfate 325 (65 FE) MG Oral Tab EC  Take 1 tablet (325 mg total) by mouth daily with breakfast.    docusate sodium 100 MG Oral Cap  Take 1 capsule (100 mg total) by mouth 2 (two) times daily. Home Meds - Unchanged    Prenatal Vit-Fe Fumarate-FA (SM PRENATAL VITAMINS) 28-0.8 MG Oral Tab  Take by mouth. Discharge Diet: As tolerated    Discharge Activity: As tolerated    Follow up: Follow-up [address] Lactation Services. Call. Specialty: Pediatrics  Why: As needed  Contact information:  [address] [place] [address]  [phone]           Brayan Steen MD Follow up in 6 week(s).     Specialty: OBSTETRICS & GYNECOLOGY  Contact information:  [address]  [address] [phone]                         Follow up Labs:  in             Kina Diaz MD  12/16/2022    Electronically signed by Brayan Steen MD on 12/16/2022  7:46 PM         REVIEWER COMMENTS

## 2023-01-12 ENCOUNTER — TELEPHONE (OUTPATIENT)
Dept: OBGYN UNIT | Facility: HOSPITAL | Age: 28
End: 2023-01-12

## (undated) NOTE — LETTER
[facility]  Administration of Anesthesia  1. I, Gwenette Curling, or _________________________________ acting on her behalf, (Patient) (Dependent/Representative) request to receive anesthesia for my pending procedure/operation/treatment.   MECCA odell bleeding, seizure, cardiac arrest and death. 7. AWARENESS: I understand that it is possible (but unlikely) to have explicit memory of events from the operating room while under general anesthesia.   8. ELECTROCONVULSIVE THERAPY PATIENTS: This consent serve below affirms that prior to the time of the procedure, I have explained to the patient and/or his/her guardian, the risks and benefits of undergoing anesthesia, as well as any reasonable alternatives.     ___________________________________________________